# Patient Record
Sex: MALE | URBAN - METROPOLITAN AREA
[De-identification: names, ages, dates, MRNs, and addresses within clinical notes are randomized per-mention and may not be internally consistent; named-entity substitution may affect disease eponyms.]

---

## 2017-01-30 ENCOUNTER — HISTORICAL (OUTPATIENT)
Dept: LAB | Facility: HOSPITAL | Age: 32
End: 2017-01-30

## 2017-10-03 ENCOUNTER — HISTORICAL (OUTPATIENT)
Dept: LAB | Facility: HOSPITAL | Age: 32
End: 2017-10-03

## 2017-10-03 LAB — DEPRECATED CALCIDIOL+CALCIFEROL SERPL-MC: 47.36 NG/ML (ref 30–80)

## 2018-03-05 ENCOUNTER — HISTORICAL (OUTPATIENT)
Dept: LAB | Facility: HOSPITAL | Age: 33
End: 2018-03-05

## 2019-03-21 ENCOUNTER — HISTORICAL (OUTPATIENT)
Dept: LAB | Facility: HOSPITAL | Age: 34
End: 2019-03-21

## 2019-03-21 LAB — DEPRECATED CALCIDIOL+CALCIFEROL SERPL-MC: 15.99 NG/ML (ref 30–80)

## 2019-10-17 ENCOUNTER — HISTORICAL (OUTPATIENT)
Dept: LAB | Facility: HOSPITAL | Age: 34
End: 2019-10-17

## 2019-10-17 LAB
ALBUMIN SERPL-MCNC: 4.5 GM/DL (ref 3.4–5)
ALBUMIN/GLOB SERPL: 1.5 {RATIO}
ALP SERPL-CCNC: 104 UNIT/L (ref 50–136)
ALT SERPL-CCNC: 38 UNIT/L (ref 12–78)
AST SERPL-CCNC: 18 UNIT/L (ref 15–37)
BILIRUB SERPL-MCNC: 0.7 MG/DL (ref 0.2–1)
BILIRUBIN DIRECT+TOT PNL SERPL-MCNC: 0.1 MG/DL (ref 0–0.2)
BILIRUBIN DIRECT+TOT PNL SERPL-MCNC: 0.6 MG/DL (ref 0–0.8)
BUN SERPL-MCNC: 11 MG/DL (ref 7–18)
CALCIUM SERPL-MCNC: 9.8 MG/DL (ref 8.5–10.1)
CHLORIDE SERPL-SCNC: 105 MMOL/L (ref 98–107)
CHOLEST SERPL-MCNC: 219 MG/DL (ref 0–200)
CHOLEST/HDLC SERPL: 7.1 {RATIO} (ref 0–5)
CO2 SERPL-SCNC: 27 MMOL/L (ref 21–32)
CREAT SERPL-MCNC: 0.91 MG/DL (ref 0.7–1.3)
DEPRECATED CALCIDIOL+CALCIFEROL SERPL-MC: 27.94 NG/ML (ref 30–80)
FERRITIN SERPL-MCNC: 107.3 NG/ML (ref 8–388)
GLOBULIN SER-MCNC: 3 GM/DL (ref 2.4–3.5)
GLUCOSE SERPL-MCNC: 109 MG/DL (ref 74–106)
HDLC SERPL-MCNC: 31 MG/DL (ref 35–60)
LDLC SERPL CALC-MCNC: 145 MG/DL (ref 0–129)
POTASSIUM SERPL-SCNC: 4.4 MMOL/L (ref 3.5–5.1)
PROT SERPL-MCNC: 7.5 GM/DL (ref 6.4–8.2)
SODIUM SERPL-SCNC: 138 MMOL/L (ref 136–145)
TRIGL SERPL-MCNC: 213 MG/DL (ref 30–150)
VLDLC SERPL CALC-MCNC: 43 MG/DL

## 2020-02-17 ENCOUNTER — HISTORICAL (OUTPATIENT)
Dept: LAB | Facility: HOSPITAL | Age: 35
End: 2020-02-17

## 2020-02-17 LAB
ABS NEUT (OLG): 4.74 X10(3)/MCL (ref 2.1–9.2)
BASOPHILS # BLD AUTO: 0.1 X10(3)/MCL (ref 0–0.2)
BASOPHILS NFR BLD AUTO: 1 %
DEPRECATED CALCIDIOL+CALCIFEROL SERPL-MC: 29.4 NG/ML (ref 30–80)
EOSINOPHIL # BLD AUTO: 0.2 X10(3)/MCL (ref 0–0.9)
EOSINOPHIL NFR BLD AUTO: 2 %
ERYTHROCYTE [DISTWIDTH] IN BLOOD BY AUTOMATED COUNT: 12.4 % (ref 11.5–17)
HCT VFR BLD AUTO: 48.1 % (ref 42–52)
HGB BLD-MCNC: 15.1 GM/DL (ref 14–18)
LYMPHOCYTES # BLD AUTO: 3.2 X10(3)/MCL (ref 0.6–4.6)
LYMPHOCYTES NFR BLD AUTO: 36 %
MCH RBC QN AUTO: 28.7 PG (ref 27–31)
MCHC RBC AUTO-ENTMCNC: 31.4 GM/DL (ref 33–36)
MCV RBC AUTO: 91.4 FL (ref 80–94)
MONOCYTES # BLD AUTO: 0.7 X10(3)/MCL (ref 0.1–1.3)
MONOCYTES NFR BLD AUTO: 8 %
NEUTROPHILS # BLD AUTO: 4.74 X10(3)/MCL (ref 2.1–9.2)
NEUTROPHILS NFR BLD AUTO: 53 %
PLATELET # BLD AUTO: 398 X10(3)/MCL (ref 130–400)
PMV BLD AUTO: 9.2 FL (ref 9.4–12.4)
RBC # BLD AUTO: 5.26 X10(6)/MCL (ref 4.7–6.1)
TSH SERPL-ACNC: 0.94 MIU/L (ref 0.36–3.74)
WBC # SPEC AUTO: 9 X10(3)/MCL (ref 4.5–11.5)

## 2020-10-14 ENCOUNTER — HISTORICAL (OUTPATIENT)
Dept: ADMINISTRATIVE | Facility: HOSPITAL | Age: 35
End: 2020-10-14

## 2020-10-14 LAB
CRP SERPL HS-MCNC: 0.04 MG/DL
DEPRECATED CALCIDIOL+CALCIFEROL SERPL-MC: 33.4 NG/ML (ref 6.6–49.9)

## 2024-05-01 ENCOUNTER — HOSPITAL ENCOUNTER (OUTPATIENT)
Facility: HOSPITAL | Age: 39
Discharge: HOME | End: 2024-05-02
Attending: STUDENT IN AN ORGANIZED HEALTH CARE EDUCATION/TRAINING PROGRAM | Admitting: INTERNAL MEDICINE
Payer: COMMERCIAL

## 2024-05-01 DIAGNOSIS — R00.0 TACHYCARDIA: ICD-10-CM

## 2024-05-01 DIAGNOSIS — T50.901A OVERDOSE: Primary | ICD-10-CM

## 2024-05-01 DIAGNOSIS — R07.9 CHEST PAIN: ICD-10-CM

## 2024-05-01 LAB
ALBUMIN SERPL-MCNC: 4.2 G/DL (ref 3.5–5)
ALBUMIN/GLOB SERPL: 1.9 RATIO (ref 1.1–2)
ALP SERPL-CCNC: 79 UNIT/L (ref 40–150)
ALT SERPL-CCNC: 20 UNIT/L (ref 0–55)
AMPHET UR QL SCN: NEGATIVE
APPEARANCE UR: CLEAR
AST SERPL-CCNC: 13 UNIT/L (ref 5–34)
BACTERIA #/AREA URNS AUTO: ABNORMAL /HPF
BARBITURATE SCN PRESENT UR: NEGATIVE
BASOPHILS # BLD AUTO: 0.03 X10(3)/MCL
BASOPHILS NFR BLD AUTO: 0.4 %
BENZODIAZ UR QL SCN: POSITIVE
BILIRUB SERPL-MCNC: 0.6 MG/DL
BILIRUB UR QL STRIP.AUTO: NEGATIVE
BUN SERPL-MCNC: 18.2 MG/DL (ref 8.9–20.6)
CALCIUM SERPL-MCNC: 8.7 MG/DL (ref 8.4–10.2)
CANNABINOIDS UR QL SCN: POSITIVE
CHLORIDE SERPL-SCNC: 106 MMOL/L (ref 98–107)
CO2 SERPL-SCNC: 24 MMOL/L (ref 22–29)
COCAINE UR QL SCN: NEGATIVE
COLOR UR AUTO: ABNORMAL
CREAT SERPL-MCNC: 0.92 MG/DL (ref 0.73–1.18)
EOSINOPHIL # BLD AUTO: 0.16 X10(3)/MCL (ref 0–0.9)
EOSINOPHIL NFR BLD AUTO: 2 %
ERYTHROCYTE [DISTWIDTH] IN BLOOD BY AUTOMATED COUNT: 11.5 % (ref 11.5–17)
FENTANYL UR QL SCN: NEGATIVE
FLUAV AG UPPER RESP QL IA.RAPID: NOT DETECTED
FLUBV AG UPPER RESP QL IA.RAPID: NOT DETECTED
GFR SERPLBLD CREATININE-BSD FMLA CKD-EPI: >60 MLS/MIN/1.73/M2
GLOBULIN SER-MCNC: 2.2 GM/DL (ref 2.4–3.5)
GLUCOSE SERPL-MCNC: 139 MG/DL (ref 74–100)
GLUCOSE UR QL STRIP.AUTO: NORMAL
HCT VFR BLD AUTO: 40.4 % (ref 42–52)
HGB BLD-MCNC: 13.3 G/DL (ref 14–18)
IMM GRANULOCYTES # BLD AUTO: 0.02 X10(3)/MCL (ref 0–0.04)
IMM GRANULOCYTES NFR BLD AUTO: 0.2 %
INR PPP: 1
KETONES UR QL STRIP.AUTO: ABNORMAL
LACTATE SERPL-SCNC: 1.9 MMOL/L (ref 0.5–2.2)
LEUKOCYTE ESTERASE UR QL STRIP.AUTO: NEGATIVE
LYMPHOCYTES # BLD AUTO: 2.08 X10(3)/MCL (ref 0.6–4.6)
LYMPHOCYTES NFR BLD AUTO: 25.7 %
MAGNESIUM SERPL-MCNC: 2 MG/DL (ref 1.6–2.6)
MCH RBC QN AUTO: 30.2 PG (ref 27–31)
MCHC RBC AUTO-ENTMCNC: 32.9 G/DL (ref 33–36)
MCV RBC AUTO: 91.8 FL (ref 80–94)
MDMA UR QL SCN: NEGATIVE
MONOCYTES # BLD AUTO: 0.41 X10(3)/MCL (ref 0.1–1.3)
MONOCYTES NFR BLD AUTO: 5.1 %
MUCOUS THREADS URNS QL MICRO: ABNORMAL /LPF
NEUTROPHILS # BLD AUTO: 5.4 X10(3)/MCL (ref 2.1–9.2)
NEUTROPHILS NFR BLD AUTO: 66.6 %
NITRITE UR QL STRIP.AUTO: NEGATIVE
NRBC BLD AUTO-RTO: 0 %
OPIATES UR QL SCN: NEGATIVE
PCP UR QL: NEGATIVE
PH UR STRIP.AUTO: 6 [PH]
PH UR: 6 [PH] (ref 3–11)
PLATELET # BLD AUTO: 274 X10(3)/MCL (ref 130–400)
PMV BLD AUTO: 8.6 FL (ref 7.4–10.4)
POTASSIUM SERPL-SCNC: 3.6 MMOL/L (ref 3.5–5.1)
PROT SERPL-MCNC: 6.4 GM/DL (ref 6.4–8.3)
PROT UR QL STRIP.AUTO: ABNORMAL
PROTHROMBIN TIME: 13.5 SECONDS (ref 12.5–14.5)
RBC # BLD AUTO: 4.4 X10(6)/MCL (ref 4.7–6.1)
RBC #/AREA URNS AUTO: ABNORMAL /HPF
RBC UR QL AUTO: NEGATIVE
RSV A 5' UTR RNA NPH QL NAA+PROBE: NOT DETECTED
SARS-COV-2 RNA RESP QL NAA+PROBE: NOT DETECTED
SODIUM SERPL-SCNC: 143 MMOL/L (ref 136–145)
SP GR UR STRIP.AUTO: 1.01 (ref 1–1.03)
SPECIFIC GRAVITY, URINE AUTO (.000) (OHS): 1.01 (ref 1–1.03)
SQUAMOUS #/AREA URNS LPF: ABNORMAL /HPF
TROPONIN I SERPL-MCNC: <0.01 NG/ML (ref 0–0.04)
UROBILINOGEN UR STRIP-ACNC: NORMAL
WBC # SPEC AUTO: 8.1 X10(3)/MCL (ref 4.5–11.5)
WBC #/AREA URNS AUTO: ABNORMAL /HPF

## 2024-05-01 PROCEDURE — 99285 EMERGENCY DEPT VISIT HI MDM: CPT | Mod: 25

## 2024-05-01 PROCEDURE — 93005 ELECTROCARDIOGRAM TRACING: CPT

## 2024-05-01 PROCEDURE — 85025 COMPLETE CBC W/AUTO DIFF WBC: CPT | Performed by: STUDENT IN AN ORGANIZED HEALTH CARE EDUCATION/TRAINING PROGRAM

## 2024-05-01 PROCEDURE — 80307 DRUG TEST PRSMV CHEM ANLYZR: CPT | Performed by: STUDENT IN AN ORGANIZED HEALTH CARE EDUCATION/TRAINING PROGRAM

## 2024-05-01 PROCEDURE — 93010 ELECTROCARDIOGRAM REPORT: CPT | Mod: 76,,, | Performed by: INTERNAL MEDICINE

## 2024-05-01 PROCEDURE — 80143 DRUG ASSAY ACETAMINOPHEN: CPT | Performed by: NURSE PRACTITIONER

## 2024-05-01 PROCEDURE — 96361 HYDRATE IV INFUSION ADD-ON: CPT

## 2024-05-01 PROCEDURE — 0241U COVID/RSV/FLU A&B PCR: CPT | Performed by: STUDENT IN AN ORGANIZED HEALTH CARE EDUCATION/TRAINING PROGRAM

## 2024-05-01 PROCEDURE — 83605 ASSAY OF LACTIC ACID: CPT | Performed by: STUDENT IN AN ORGANIZED HEALTH CARE EDUCATION/TRAINING PROGRAM

## 2024-05-01 PROCEDURE — 84484 ASSAY OF TROPONIN QUANT: CPT | Performed by: STUDENT IN AN ORGANIZED HEALTH CARE EDUCATION/TRAINING PROGRAM

## 2024-05-01 PROCEDURE — 80053 COMPREHEN METABOLIC PANEL: CPT | Performed by: STUDENT IN AN ORGANIZED HEALTH CARE EDUCATION/TRAINING PROGRAM

## 2024-05-01 PROCEDURE — G0378 HOSPITAL OBSERVATION PER HR: HCPCS

## 2024-05-01 PROCEDURE — 83735 ASSAY OF MAGNESIUM: CPT | Performed by: STUDENT IN AN ORGANIZED HEALTH CARE EDUCATION/TRAINING PROGRAM

## 2024-05-01 PROCEDURE — 80179 DRUG ASSAY SALICYLATE: CPT | Performed by: NURSE PRACTITIONER

## 2024-05-01 PROCEDURE — 81015 MICROSCOPIC EXAM OF URINE: CPT | Performed by: STUDENT IN AN ORGANIZED HEALTH CARE EDUCATION/TRAINING PROGRAM

## 2024-05-01 PROCEDURE — 63600175 PHARM REV CODE 636 W HCPCS: Performed by: STUDENT IN AN ORGANIZED HEALTH CARE EDUCATION/TRAINING PROGRAM

## 2024-05-01 PROCEDURE — 96360 HYDRATION IV INFUSION INIT: CPT

## 2024-05-01 PROCEDURE — 85610 PROTHROMBIN TIME: CPT | Performed by: STUDENT IN AN ORGANIZED HEALTH CARE EDUCATION/TRAINING PROGRAM

## 2024-05-01 RX ORDER — DESVENLAFAXINE 100 MG/1
100 TABLET, EXTENDED RELEASE ORAL DAILY
COMMUNITY

## 2024-05-01 RX ORDER — IBUPROFEN 200 MG
24 TABLET ORAL
Status: DISCONTINUED | OUTPATIENT
Start: 2024-05-02 | End: 2024-05-02 | Stop reason: HOSPADM

## 2024-05-01 RX ORDER — LAMOTRIGINE 150 MG/1
25 TABLET ORAL DAILY
Status: ON HOLD | COMMUNITY
End: 2024-05-02

## 2024-05-01 RX ORDER — NALOXONE HCL 0.4 MG/ML
0.02 VIAL (ML) INJECTION
Status: DISCONTINUED | OUTPATIENT
Start: 2024-05-02 | End: 2024-05-02 | Stop reason: HOSPADM

## 2024-05-01 RX ORDER — TALC
6 POWDER (GRAM) TOPICAL NIGHTLY PRN
Status: DISCONTINUED | OUTPATIENT
Start: 2024-05-02 | End: 2024-05-02 | Stop reason: HOSPADM

## 2024-05-01 RX ORDER — CLONAZEPAM 0.5 MG/1
1 TABLET ORAL 3 TIMES DAILY PRN
Status: ON HOLD | COMMUNITY
End: 2024-05-02

## 2024-05-01 RX ORDER — ALUMINUM HYDROXIDE, MAGNESIUM HYDROXIDE, AND SIMETHICONE 1200; 120; 1200 MG/30ML; MG/30ML; MG/30ML
30 SUSPENSION ORAL 4 TIMES DAILY PRN
Status: DISCONTINUED | OUTPATIENT
Start: 2024-05-02 | End: 2024-05-02 | Stop reason: HOSPADM

## 2024-05-01 RX ORDER — GLUCAGON 1 MG
1 KIT INJECTION
Status: DISCONTINUED | OUTPATIENT
Start: 2024-05-02 | End: 2024-05-02 | Stop reason: HOSPADM

## 2024-05-01 RX ORDER — ACETAMINOPHEN 500 MG
1000 TABLET ORAL EVERY 6 HOURS PRN
Status: DISCONTINUED | OUTPATIENT
Start: 2024-05-02 | End: 2024-05-02 | Stop reason: HOSPADM

## 2024-05-01 RX ORDER — ACETAMINOPHEN 325 MG/1
650 TABLET ORAL EVERY 4 HOURS PRN
Status: DISCONTINUED | OUTPATIENT
Start: 2024-05-02 | End: 2024-05-02 | Stop reason: HOSPADM

## 2024-05-01 RX ORDER — ONDANSETRON HYDROCHLORIDE 2 MG/ML
4 INJECTION, SOLUTION INTRAVENOUS EVERY 4 HOURS PRN
Status: DISCONTINUED | OUTPATIENT
Start: 2024-05-02 | End: 2024-05-02 | Stop reason: HOSPADM

## 2024-05-01 RX ORDER — SODIUM CHLORIDE 0.9 % (FLUSH) 0.9 %
10 SYRINGE (ML) INJECTION
Status: DISCONTINUED | OUTPATIENT
Start: 2024-05-02 | End: 2024-05-02 | Stop reason: HOSPADM

## 2024-05-01 RX ORDER — TRAZODONE HYDROCHLORIDE 100 MG/1
200 TABLET ORAL NIGHTLY
COMMUNITY

## 2024-05-01 RX ORDER — AMOXICILLIN 250 MG
1 CAPSULE ORAL 2 TIMES DAILY PRN
Status: DISCONTINUED | OUTPATIENT
Start: 2024-05-02 | End: 2024-05-02 | Stop reason: HOSPADM

## 2024-05-01 RX ORDER — MIRTAZAPINE 30 MG/1
30 TABLET, ORALLY DISINTEGRATING ORAL NIGHTLY
COMMUNITY

## 2024-05-01 RX ORDER — QUETIAPINE FUMARATE 100 MG/1
100 TABLET, FILM COATED ORAL 3 TIMES DAILY
Status: ON HOLD | COMMUNITY
End: 2024-05-02

## 2024-05-01 RX ORDER — IBUPROFEN 200 MG
16 TABLET ORAL
Status: DISCONTINUED | OUTPATIENT
Start: 2024-05-02 | End: 2024-05-02 | Stop reason: HOSPADM

## 2024-05-01 RX ORDER — PROCHLORPERAZINE EDISYLATE 5 MG/ML
5 INJECTION INTRAMUSCULAR; INTRAVENOUS EVERY 6 HOURS PRN
Status: DISCONTINUED | OUTPATIENT
Start: 2024-05-02 | End: 2024-05-02 | Stop reason: HOSPADM

## 2024-05-01 RX ORDER — BISACODYL 10 MG/1
10 SUPPOSITORY RECTAL DAILY PRN
Status: DISCONTINUED | OUTPATIENT
Start: 2024-05-02 | End: 2024-05-02 | Stop reason: HOSPADM

## 2024-05-01 RX ADMIN — SODIUM CHLORIDE, POTASSIUM CHLORIDE, SODIUM LACTATE AND CALCIUM CHLORIDE 1000 ML: 600; 310; 30; 20 INJECTION, SOLUTION INTRAVENOUS at 04:05

## 2024-05-01 RX ADMIN — SODIUM CHLORIDE, POTASSIUM CHLORIDE, SODIUM LACTATE AND CALCIUM CHLORIDE 1000 ML: 600; 310; 30; 20 INJECTION, SOLUTION INTRAVENOUS at 02:05

## 2024-05-01 NOTE — Clinical Note
Diagnosis: Overdose [202577]   Future Attending Provider: EMMANUEL JIMENEZ [[id number]]   Admit to which facility:: [facility] [[id number]]

## 2024-05-01 NOTE — ED PROVIDER NOTES
Encounter Date: 5/1/2024    SCRIBE #1 NOTE: I, Yao Mike, am scribing for, and in the presence of,  Sumit Bishop MD. I have scribed the following portions of the note - Other sections scribed: HPI, ROS, PE.       History     Chief Complaint   Patient presents with    Drug Overdose     Pt reports he took his Seroquel at a different dose than was ordered for him. Pt appears sedated in triage. Pt awakens to sternal rub. Pt states he is unsure how many he took. Denies SI. 2mg narcan given en route with no change.      Pt is a 38 y.o. male with a pMHx of depression, anxiety, insomnia, and bipolar disorder presents to the ED following a drug overdose last night. Pt states that he was having trouble sleeping so took more Seroquel regulo he normally takes. Per pt's father; pt was a bit altered yesterday, but nothing compared to the way he was found this morning. The pt reportedly dropped his prescription of Clonazepam and lost a significant portion of them. Since then he has been altering his dosage in order to account for the missing meds un til he can refill his prescription. The pt's father believes the overdose was accidental and the pt himself adamantly denies suicidal ideation.     The history is provided by the patient and medical records. No  was used.     Review of patient's allergies indicates:  No Known Allergies  No past medical history on file.  No past surgical history on file.  No family history on file.     Review of Systems   Psychiatric/Behavioral:  Positive for sleep disturbance. Negative for self-injury and suicidal ideas. The patient is nervous/anxious.        Physical Exam     Initial Vitals [05/01/24 1248]   BP Pulse Resp Temp SpO2   130/84 108 15 98.1 °F (36.7 °C) 98 %      MAP       --         Physical Exam    Constitutional: He appears well-developed and well-nourished. He is not diaphoretic. No distress.   Drowsy, but arousable by verbal stimuli.   HENT:   Head:  Normocephalic and atraumatic.   Right Ear: External ear normal.   Left Ear: External ear normal.   Nose: Nose normal.   Pupils are 2-3mm.   Eyes: EOM are normal. Pupils are equal, round, and reactive to light. Right eye exhibits no discharge. Left eye exhibits no discharge.   Cardiovascular:  Normal rate, regular rhythm and normal heart sounds.     Exam reveals no gallop and no friction rub.       No murmur heard.  Pulmonary/Chest: Effort normal and breath sounds normal. No respiratory distress. He has no wheezes. He has no rhonchi. He has no rales. He exhibits no tenderness.   Abdominal: Abdomen is soft. Bowel sounds are normal. He exhibits no distension and no mass. There is no abdominal tenderness. There is no rebound and no guarding.   Musculoskeletal:         General: No edema. Normal range of motion.     Neurological: No cranial nerve deficit or sensory deficit.   Drowsy but arousable   Skin: Skin is warm and dry. Capillary refill takes less than 2 seconds.         ED Course   Critical Care    Date/Time: 5/20/2024 6:02 AM    Performed by: Sumit Bishop MD  Authorized by: Sumit Bishop MD  Direct patient critical care time: 9 minutes  Additional history critical care time: 7 minutes  Ordering / reviewing critical care time: 8 minutes  Documentation critical care time: 5 minutes  Consulting other physicians critical care time: 6 minutes  Total critical care time (exclusive of procedural time) : 35 minutes  Critical care time was exclusive of separately billable procedures and treating other patients.  Critical care was necessary to treat or prevent imminent or life-threatening deterioration of the following conditions: toxidrome.  Critical care was time spent personally by me on the following activities: development of treatment plan with patient or surrogate, discussions with consultants, discussions with primary provider, interpretation of cardiac output measurements, evaluation of patient's response  to treatment, examination of patient, obtaining history from patient or surrogate, ordering and performing treatments and interventions, ordering and review of laboratory studies, ordering and review of radiographic studies, pulse oximetry, re-evaluation of patient's condition and review of old charts.  Comments: Patient with potential overdose requiring admission for monitor returned.        Labs Reviewed   COMPREHENSIVE METABOLIC PANEL - Abnormal; Notable for the following components:       Result Value    Glucose 139 (*)     Globulin 2.2 (*)     All other components within normal limits   URINALYSIS, REFLEX TO URINE CULTURE - Abnormal; Notable for the following components:    Protein, UA Trace (*)     Ketones, UA 1+ (*)     Mucous, UA Trace (*)     All other components within normal limits   DRUG SCREEN, URINE (BEAKER) - Abnormal; Notable for the following components:    Benzodiazepine, Urine Positive (*)     Cannabinoids, Urine Positive (*)     All other components within normal limits    Narrative:     Cut off concentrations:    Amphetamines - 1000 ng/ml  Barbiturates - 200 ng/ml  Benzodiazepine - 200 ng/ml  Cannabinoids (THC) - 50 ng/ml  Cocaine - 300 ng/ml  Fentanyl - 1.0 ng/ml  MDMA - 500 ng/ml  Opiates - 300 ng/ml   Phencyclidine (PCP) - 25 ng/ml    Specimen submitted for drug analysis and tested for pH and specific gravity in order to evaluate sample integrity. Suspect tampering if specific gravity is <1.003 and/or pH is not within the range of 4.5 - 8.0  False negatives may result form substances such as bleach added to urine.  False positives may result for the presence of a substance with similar chemical structure to the drug or its metabolite.    This test provides only a PRELIMINARY analytical test result. A more specific alternate chemical method must be used in order to obtain a confirmed analytical result. Gas chromatography/mass spectrometry (GC/MS) is the preferred confirmatory method. Other  chemical confirmation methods are available. Clinical consideration and professional judgement should be applied to any drug of abuse test result, particularly when preliminary positive results are used.    Positive results will be confirmed only at the physicians request. Unconfirmed screening results are to be used only for medical purposes (treatment).        CBC WITH DIFFERENTIAL - Abnormal; Notable for the following components:    RBC 4.40 (*)     Hgb 13.3 (*)     Hct 40.4 (*)     MCHC 32.9 (*)     All other components within normal limits   ACETAMINOPHEN LEVEL - Abnormal; Notable for the following components:    Acetaminophen Level <3.0 (*)     All other components within normal limits   SALICYLATE LEVEL - Abnormal; Notable for the following components:    Salicylate Level <5.0 (*)     All other components within normal limits   COVID/RSV/FLU A&B PCR - Normal    Narrative:     The Xpert Xpress SARS-CoV-2/FLU/RSV plus is a rapid, multiplexed real-time PCR test intended for the simultaneous qualitative detection and differentiation of SARS-CoV-2, Influenza A, Influenza B, and respiratory syncytial virus (RSV) viral RNA in either nasopharyngeal swab or nasal swab specimens.         TROPONIN I - Normal   MAGNESIUM - Normal   PROTIME-INR - Normal   LACTIC ACID, PLASMA - Normal   CBC W/ AUTO DIFFERENTIAL    Narrative:     The following orders were created for panel order CBC auto differential.  Procedure                               Abnormality         Status                     ---------                               -----------         ------                     CBC with Differential[[id number]]       Abnormal            Final result                 Please view results for these tests on the individual orders.        ECG Results              EKG 12-lead (Final result)        Collection Time Result Time QRS Duration [facility] QTC Calculation    05/01/24 16:08:26 05/02/24 10:56:24 88 460                     Final result  by Interface, Lab In [facility] (05/02/24 10:56:32)                   Narrative:    Test Reason : T50.901A,    Vent. Rate : 120 BPM     Atrial Rate : 120 BPM     P-R Int : 164 ms          QRS Dur : 088 ms      QT Int : 326 ms       P-R-T Axes : 054 094 -08 degrees     QTc Int : 460 ms    Sinus tachycardia  Rightward axis  T wave abnormality, consider inferior ischemia  Abnormal ECG  No previous ECGs available  Confirmed by Mehdi Negrete MD ([id number]) on 5/2/2024 10:56:23 AM    Referred By:             Confirmed By:Mehdi Negrete MD                                     EKG 12-lead (Final result)        Collection Time Result Time QRS Duration [facility] QTC Calculation    05/01/24 13:04:04 05/02/24 10:54:59 92 483                     Final result by Interface, Lab In [facility] (05/02/24 10:55:08)                   Narrative:    Test Reason : R00.0,    Vent. Rate : 106 BPM     Atrial Rate : 106 BPM     P-R Int : 174 ms          QRS Dur : 092 ms      QT Int : 364 ms       P-R-T Axes : 017 019 010 degrees     QTc Int : 483 ms    Sinus tachycardia  Nonspecific T wave abnormality  Abnormal ECG  No previous ECGs available  Confirmed by Mehdi Negrete MD ([id number]) on 5/2/2024 10:54:56 AM    Referred By:             Confirmed By:Mehdi Negrete MD                                     EKG 12-lead (Final result)  Result time 05/08/24 14:03:18      Final result by Unknown User (05/08/24 14:03:18)                                      Imaging Results              CT Head Without Contrast (Final result)  Result time 05/01/24 13:52:59      Final result by Santhosh Arevalo MD (05/01/24 13:52:59)                   Impression:      No acute intracranial findings identified.      Electronically signed by: Santhosh Arevalo  Date:    05/01/2024  Time:    13:52               Narrative:    EXAMINATION:  CT HEAD WITHOUT CONTRAST    CLINICAL HISTORY:  Mental status change, unknown cause;    TECHNIQUE:  Sequential axial images were performed of the brain without contrast.    Dose  product length of 2192 mGycm. Automated exposure control was utilized to minimize radiation dose.    COMPARISON:  None available.    FINDINGS:  Gray-white matter differentiation is unremarkable.  There is no intracranial mass effect, midline shift, hydrocephalus or hemorrhage. There is no sulcal effacement or low attenuation changes to suggest recent large vessel territory infarction.  There is no acute extra axial fluid collection. Visualized paranasal sinuses are clear without mucosal thickening, polypoidal abnormality or air-fluid levels. Mastoid air cells aeration is optimal.                                       X-Ray Chest AP Portable (Final result)  Result time 05/01/24 13:37:08      Final result by Ernie Ferguson MD (05/01/24 13:37:08)                   Impression:      No acute findings.      Electronically signed by: Ernie Ferguson  Date:    05/01/2024  Time:    13:37               Narrative:    EXAMINATION:  XR CHEST AP PORTABLE    CLINICAL HISTORY:  Overdose;    COMPARISON:  No priors    FINDINGS:  Frontal view of the chest was obtained. The heart is not enlarged.  Lungs are grossly clear.  There is no pneumothorax or significant effusion.                                       Medications   lactated ringers bolus 1,000 mL (0 mLs Intravenous Stopped 5/1/24 1508)   lactated ringers bolus 1,000 mL (0 mLs Intravenous Stopped 5/1/24 1730)     Medical Decision Making  The list of differential diagnoses includes, but is not limited to, suicidal ideations, homicidal ideations, auditory or visual hallucinations, drug/etoh intoxication, bipolar disorder, schizophrenia, schizoaffective, delusional disorder, major depressive disorder, PTSD, substance induced mood disorder, violent behavior, suicidal attempt, non-psychiatric medical illness, malingering      Patient appears to have had a accidental overdose.  He was adamant he had not attempt to harm himself however he remains somewhat drowsy I am not comfortable  sending him home.  He was tachycardia is resolved.  His QT-C remains within normal limits.  He was arousable more alert on re-evaluation but still uncomfortable sending him home.  Chart review does not reveal any past medical history of suicide attempts but does reveal psych history.  We will admit to observation hopefully he can further metabolize his medications until he was more awake and alert than he will be suitable for discharge home.  Patient was comfortable with the plan.  Care transferred.    Amount and/or Complexity of Data Reviewed  External Data Reviewed: notes.     Details: History of depression per chart review  Labs: ordered. Decision-making details documented in ED Course.  Radiology: ordered.  ECG/medicine tests: ordered and independent interpretation performed. Decision-making details documented in ED Course.    Risk  OTC drugs.  Prescription drug management.  Decision regarding hospitalization.            Scribe Attestation:   Scribe #1: I performed the above scribed service and the documentation accurately describes the services I performed. I attest to the accuracy of the note.    Attending Attestation:           Physician Attestation for Scribe:  Physician Attestation Statement for Scribe #1: I, Sumit Bishop MD, reviewed documentation, as scribed by Yao Mike in my presence, and it is both accurate and complete.             ED Course as of 05/20/24 0603   Wed May 01, 2024   1331 EKG done at 1:04 p.m. shows sinus tach rate of 106 .  Normal axis no ST elevation or depression.   [MM]   1522 Influenza A, Molecular: Not Detected [MM]   1522 Influenza B, Molecular: Not Detected [MM]   1522 RSV Ag by Molecular Method: Not Detected [MM]   1522 SARS-CoV2 (COVID-19) Qualitative PCR: Not Detected [MM]   1522 Troponin I: <0.010 [MM]   1522 Magnesium : 2.00 [MM]   1522 INR: 1.0 [MM]   1522 WBC: 8.10 [MM]   1522 Hemoglobin(!): 13.3 [MM]   1522 Hematocrit(!): 40.4 [MM]   1522 Sodium: 143 [MM]    1522 Potassium: 3.6 [MM]   1522 Chloride: 106 [MM]   1522 CO2: 24 [MM]   1522 BUN: 18.2 [MM]   1522 Creatinine: 0.92 [MM]   1618 Patient beginning to become somewhat tachycardic.  Repeat EKG is ordered.  Done at 4:08 p.m. shows sinus tach rate of 120.  .  Normal axis.  Continued inverted T-wave in the 3.  .  [MM]   1629 I have seen evaluated patient.  More awake and alert.  Trying to get out of bed so he can go urinate.  Will be assisted to standing by nurse.  Heart rate 120s whenever he was in the room.  We will continue to monitor. [MM]   1731 Benzodiazepine, Urine(!): Positive [MM]   1731 Cannabinoids, Urine(!): Positive [MM]   2000 On re-evaluation patient was slightly more awake.  He was tachycardic has resolved after fluid unfortunately he continues to be significantly altered not making much sense.  He continues to adamantly deny any suicide attempts earlier today.  I am still unable to get a good story about what occurred today.  I believe we will have to admit him to observation due to his continued drowsiness. [MM]      ED Course User Index  [MM] Sumit Bishop MD                           Clinical Impression:  Final diagnoses:  [T50.901A] Overdose (Primary)  [R00.0] Tachycardia          ED Disposition Condition    Observation Stable                Sumit Bishop MD  05/01/24 2030       Sumit Bishop MD  05/20/24 0603

## 2024-05-02 ENCOUNTER — HOSPITAL ENCOUNTER (EMERGENCY)
Facility: HOSPITAL | Age: 39
Discharge: HOME | End: 2024-05-02
Attending: STUDENT IN AN ORGANIZED HEALTH CARE EDUCATION/TRAINING PROGRAM
Payer: COMMERCIAL

## 2024-05-02 VITALS
BODY MASS INDEX: 29.62 KG/M2 | HEART RATE: 107 BPM | SYSTOLIC BLOOD PRESSURE: 129 MMHG | OXYGEN SATURATION: 98 % | HEIGHT: 69 IN | DIASTOLIC BLOOD PRESSURE: 81 MMHG | TEMPERATURE: 99 F | RESPIRATION RATE: 20 BRPM | WEIGHT: 200 LBS

## 2024-05-02 VITALS
BODY MASS INDEX: 29.62 KG/M2 | TEMPERATURE: 98 F | HEART RATE: 100 BPM | OXYGEN SATURATION: 96 % | RESPIRATION RATE: 18 BRPM | DIASTOLIC BLOOD PRESSURE: 89 MMHG | WEIGHT: 200 LBS | HEIGHT: 69 IN | SYSTOLIC BLOOD PRESSURE: 128 MMHG

## 2024-05-02 DIAGNOSIS — Z76.0 MEDICATION REFILL: Primary | ICD-10-CM

## 2024-05-02 PROBLEM — T50.901A OVERDOSE: Status: ACTIVE | Noted: 2024-05-02

## 2024-05-02 LAB
APAP SERPL-MCNC: <3 UG/ML (ref 10–30)
OHS QRS DURATION: 88 MS
OHS QRS DURATION: 92 MS
OHS QTC CALCULATION: 460 MS
OHS QTC CALCULATION: 483 MS
SALICYLATES SERPL-MCNC: <5 MG/DL (ref 15–30)

## 2024-05-02 PROCEDURE — G0378 HOSPITAL OBSERVATION PER HR: HCPCS

## 2024-05-02 PROCEDURE — 99281 EMR DPT VST MAYX REQ PHY/QHP: CPT

## 2024-05-02 PROCEDURE — 25000003 PHARM REV CODE 250: Performed by: INTERNAL MEDICINE

## 2024-05-02 RX ORDER — DESVENLAFAXINE SUCCINATE 50 MG/1
100 TABLET, EXTENDED RELEASE ORAL DAILY
Status: DISCONTINUED | OUTPATIENT
Start: 2024-05-02 | End: 2024-05-02 | Stop reason: HOSPADM

## 2024-05-02 RX ORDER — QUETIAPINE FUMARATE 50 MG/1
50 TABLET, FILM COATED ORAL 3 TIMES DAILY
Start: 2024-05-02

## 2024-05-02 RX ORDER — CLONAZEPAM 0.5 MG/1
0.5 TABLET ORAL 3 TIMES DAILY PRN
Start: 2024-05-02 | End: 2024-05-02

## 2024-05-02 RX ORDER — QUETIAPINE FUMARATE 25 MG/1
50 TABLET, FILM COATED ORAL 2 TIMES DAILY
Status: DISCONTINUED | OUTPATIENT
Start: 2024-05-02 | End: 2024-05-02 | Stop reason: HOSPADM

## 2024-05-02 RX ORDER — CLONAZEPAM 0.5 MG/1
0.5 TABLET ORAL 2 TIMES DAILY
Status: DISCONTINUED | OUTPATIENT
Start: 2024-05-02 | End: 2024-05-02 | Stop reason: HOSPADM

## 2024-05-02 RX ORDER — LAMOTRIGINE 150 MG/1
150 TABLET ORAL DAILY
Start: 2024-05-02

## 2024-05-02 RX ORDER — CLONAZEPAM 0.25 MG/1
0.25 TABLET, ORALLY DISINTEGRATING ORAL 2 TIMES DAILY PRN
Qty: 28 TABLET | Refills: 0 | Status: SHIPPED | OUTPATIENT
Start: 2024-05-02 | End: 2024-05-16

## 2024-05-02 RX ADMIN — DESVENLAFAXINE 100 MG: 50 TABLET, FILM COATED, EXTENDED RELEASE ORAL at 08:05

## 2024-05-02 RX ADMIN — QUETIAPINE FUMARATE 50 MG: 25 TABLET ORAL at 10:05

## 2024-05-02 RX ADMIN — CLONAZEPAM 0.5 MG: 0.5 TABLET ORAL at 10:05

## 2024-05-02 NOTE — H&P
[facility] Hospital Medicine - H&P Note    Patient Name: Ernesto Cuello IV  : 1985  MRN: [id number]  PCP: Wang Marinelli MD  Admitting Physician:  GARETH Atkinson MD  Admission Class: OP- Observation   Code status: Full    Allergies   Patient has no known allergies.    Chief Complaint   Unable to obtain    History of Present Illness   38 yr old male whose history includes depression, anxiety, and bipolar disorder. At the time of my exam patient appears drowsy but he is awake and answering questions. However, he is either unable or unwilling to state why he came to the ED. He speech is rambling. Initially he stated he ran out of his clonazepam and came to ED so he would not go into withdrawals. Then he reported he took Seroquel 300 mg at once instead of the TID divided doses because it works better. Adamantly denies any suicidal ideation. According to ED note his father brought him in for evaluation because he appeared altered.     VS on arrival: T 98.1, P 108, R 15, B/P 130/84. Initial labs unremarkable. UDS positive for benzodiazepines and cannabinoids. Chest x-ray and head CT both negative for acute findings.     ROS   Except as documented, all other systems reviewed and negative     Past Medical History   Anxiety   Depression  Bipolar disorder    Past Surgical History   [place] tooth extraction     Social History   Current everyday smoker. Denies alcohol use. Smokes marijuana.     Family History   Reviewed and negative    Home Medications     No current facility-administered medications on file prior to encounter.     Current Outpatient Medications on File Prior to Encounter   Medication Sig Dispense Refill    clonazePAM (KLONOPIN) 0.5 MG tablet Take 1 mg by mouth 3 (three) times daily as needed for Anxiety.      desvenlafaxine succinate (PRISTIQ) 100 MG Tb24 Take 100 mg by mouth once daily.      lamoTRIgine (LAMICTAL) 150 MG Tab Take 25 mg by mouth once daily.       mirtazapine (REMERON SOL-TAB) 30 MG disintegrating tablet Take 30 mg by mouth every evening.      QUEtiapine (SEROQUEL) 100 MG Tab Take 100 mg by mouth 3 (three) times daily.      traZODone (DESYREL) 100 MG tablet Take 200 mg by mouth every evening.        Physical Exam   Vital Signs  Temp:  [98.1 °F (36.7 °C)]   Pulse:  []   Resp:  [13-19]   BP: (119-151)/(78-94)   SpO2:  [95 %-99 %]    General: Appears comfortable  HEENT: NC/AT  Neck:  No JVD  Chest: CTABL  CVS: Regular rhythm. Normal S1/S2.  Abdomen: nondistended, normoactive BS, soft and non-tender.  MSK: No obvious deformity or joint swelling  Skin: Warm and dry  Neuro: AAOx3, no focal neurological deficit  Psych: Cooperative    Labs     Recent Labs     05/01/24  1315   WBC 8.10   RBC 4.40*   HGB 13.3*   HCT 40.4*   MCV 91.8   MCH 30.2   MCHC 32.9*   RDW 11.5        Recent Labs     05/01/24  1315   PROTIME 13.5   INR 1.0      Recent Labs     05/01/24  1315      K 3.6   CHLORIDE 106   CO2 24   BUN 18.2   CREATININE 0.92   EGFRNORACEVR >60   GLUCOSE 139*   CALCIUM 8.7   MG 2.00   ALBUMIN 4.2   GLOBULIN 2.2*   ALKPHOS 79   ALT 20   AST 13   BILITOT 0.6     Recent Labs     05/01/24  1315   LACTIC 1.9     Recent Labs     05/01/24  1315   TROPONINI <0.010        Microbiology Results (last 7 days)       ** No results found for the last 168 hours. **           Imaging   CT Head Without Contrast  Narrative: EXAMINATION:  CT HEAD WITHOUT CONTRAST    CLINICAL HISTORY:  Mental status change, unknown cause;    TECHNIQUE:  Sequential axial images were performed of the brain without contrast.    Dose product length of 2192 mGycm. Automated exposure control was utilized to minimize radiation dose.    COMPARISON:  None available.    FINDINGS:  Gray-white matter differentiation is unremarkable.  There is no intracranial mass effect, midline shift, hydrocephalus or hemorrhage. There is no sulcal effacement or low attenuation changes to suggest recent large  vessel territory infarction.  There is no acute extra axial fluid collection. Visualized paranasal sinuses are clear without mucosal thickening, polypoidal abnormality or air-fluid levels. Mastoid air cells aeration is optimal.  Impression: No acute intracranial findings identified.    Electronically signed by: Santhosh Arevalo  Date:    05/01/2024  Time:    13:52  X-Ray Chest AP Portable  Narrative: EXAMINATION:  XR CHEST AP PORTABLE    CLINICAL HISTORY:  Overdose;    COMPARISON:  No priors    FINDINGS:  Frontal view of the chest was obtained. The heart is not enlarged.  Lungs are grossly clear.  There is no pneumothorax or significant effusion.  Impression: No acute findings.    Electronically signed by: Ernie Ferguson  Date:    05/01/2024  Time:    13:37    Assessment & Plan     Clonazepam overdose - intentional misuse but no suicidal ideation   Acute toxic encephalopathy 2/2 above    - treatment is supportive. Aside from lethargy and mild tachycardia he is hemodynamically stable. No QT prolongation on ECG. Continue telemetry monitoring.   - will check acetaminophen and salicylate levels to complete workup  - will resume clonazepam once sedation wears off to avoid withdrawals    PMH: anxiety, depression, Bipolar disorder    VTE Prophylaxis: Sidney BERGERON, Sara Osborne, [facility] have discussed this patients case with Dr. Atkinson who agrees with the diagnosis and treatment plan.      ________________________________________________________________________  I, Dr. Satinder Atkinson assumed care of this patient.  For the patient encounter, I performed the substantive portion of the visit, I reviewed the NPPA documentation, treatment plan, and medical decision making.  I had face to face time with this patient.  I have personally reviewed the labs and test results that are presently available. I have reviewed or attempted to review medical records based upon their availability. If patient was admitted under observational  status it is with my approval.      38-year-old male with reported completely unintentional overdose of clonazepam, family members found him altered.  He reports he took no less than 5 pills of clonazepam which are about 1 mg each but is not sure why he did this and can not give a great explanation.  He absolutely denies suicidal ideation.  Denies any prior history of being suicidal or suicide attempts.  His mentation is still not quite normal and he was very drowsy so will monitor overnight.    Time seen:  11:00 p.m. 5/1  Critical care time: 35 min; Critical care diagnosis:  Toxic encephalopathy/overdose   Satinder Atkinson MD

## 2024-05-02 NOTE — PLAN OF CARE
05/02/24 1209   Discharge Assessment   Assessment Type Discharge Planning Assessment   Confirmed/corrected address, phone number and insurance Yes   Confirmed Demographics   (pt's address: [address] [place] [address], [place])   Source of Information patient   Communicated MILES with patient/caregiver Date not available/Unable to determine   Reason For Admission over dose, chest pain   People in Home significant other  (Pt lives with his Karen alfredo in a single story home with no steps to enter)   Do you expect to return to your current living situation? Yes   Do you have help at home or someone to help you manage your care at home? No   Prior to hospitilization cognitive status: Unable to Assess   Current cognitive status: Alert/Oriented   Walking or Climbing Stairs Difficulty no   Dressing/Bathing Difficulty no   Home Layout Able to live on 1st floor   Equipment Currently Used at Home none   Readmission within 30 days? No   Patient currently being followed by outpatient case management? No   Do you currently have service(s) that help you manage your care at home? No   Do you have prescription coverage? Yes   Coverage BCBS   Who is going to help you get home at discharge? parents or karen alfredo   How do you get to doctors appointments? family or friend will provide   Are you on dialysis? No   Discharge Plan A Home   Discharge Plan B Home   DME Needed Upon Discharge  none   Discharge Plan discussed with: Patient   Transition of Care Barriers None   OTHER   Name(s) of People in Home Karen alfredo     Pt's PCP is Dr Wang Marinelli. Pt's  is his father, Ernesto ([phone]). Pt has never had HH services. Pt uses [facility]. Pt does not work and is in b/w jobs at this time. Pt has no dc needs at this time.

## 2024-05-02 NOTE — CONSULTS
5/2/2024  Ernesto Cuello IV   1985   [id number]            Psychiatry Initial Consult Note    Date of Admission: 5/1/2024 12:52 PM    Chief Complaint: Accidental overdose     SUBJECTIVE:   History of Present Illness:   Ernesto Cuello IV is a 38 y.o. male with a history of bipolar disorder and depression who presented to [facility] ED on 05/01/24 due to accidental drug overdose. He reports today that he dropped his clonazepam prescription and lost a majority of them in early April and had to ration his medications and took 0.25mg at a time. Reports he received his new prescription yesterday and took one mg resulting in altered level of consciousness. He reports that this was not a suicide attempt and last suicidal ideations were in 2020 when he was admitted to the inpatient psychiatric hospital. Reports history of manic and depressive episodes. Last manic episode in December 2022. Recent intermittent feelings of depression due to looking for a job, but no issues with sleep/appetite, loss of interest in normal activities, or feelings of guilt. No evidence of psychosis or simon at this time. Reports that he started seeing his current outpatient psychiatric provider approximately six months ago who has gotten him off several medications and that his next plan is to wean himself off of clonazepam with her help. Displays future-directed thinking and is a low immediate risk for self-harm.        Past Psychiatric History:   Previous Psychiatric Hospitalizations: Reports hospitalization in 2020 for depression/suicidal ideations   Previous Medication Trials: Rexulti, seroquel, trazodone, pristiq, clonazepam  Previous Suicide Attempts: Denies   Outpatient psychiatrist: Karen REYNA    Past Medical/Surgical History:   No past medical history on file.  No past surgical history on file.      Family Psychiatric History:   Denies     Allergies:   Review of patient's allergies indicates:  No Known Allergies    Substance  Abuse History:   Tobacco: Denies  Alcohol: Denies  Illicit Substances: Denies, UDS positive for cannabinoids  Treatment: Denies      Current Medications:   Home Psychiatric Meds: Clonazepam 1mg PO TID, desvenlafaxine 100mg PO QD; Lamictal 150mg PO BID; Remeron 30mg PO QHS; Quetiapine 100mg PO QAM/100mg PO Qnoon/400mg PO QHS; Trazodone 200mg PO QHS    Scheduled Meds:   Current Facility-Administered Medications   Medication Dose Route Frequency    clonazePAM  0.5 mg Oral BID    desvenlafaxine succinate  100 mg Oral Daily    QUEtiapine  50 mg Oral BID      PRN Meds:   Current Facility-Administered Medications:     acetaminophen, 1,000 mg, Oral, Q6H PRN    acetaminophen, 650 mg, Oral, Q4H PRN    aluminum-magnesium hydroxide-simethicone, 30 mL, Oral, QID PRN    bisacodyL, 10 mg, Rectal, Daily PRN    dextrose 10%, 12.5 g, Intravenous, PRN    dextrose 10%, 25 g, Intravenous, PRN    glucagon (human recombinant), 1 mg, Intramuscular, PRN    glucose, 16 g, Oral, PRN    glucose, 24 g, Oral, PRN    melatonin, 6 mg, Oral, Nightly PRN    naloxone, 0.02 mg, Intravenous, PRN    ondansetron, 4 mg, Intravenous, Q4H PRN    prochlorperazine, 5 mg, Intravenous, Q6H PRN    senna-docusate 8.6-50 mg, 1 tablet, Oral, BID PRN    sodium chloride 0.9%, 10 mL, Intravenous, PRN   Psychotherapeutics (From admission, onward)      Start     Stop Route Frequency Ordered    05/02/24 1000  QUEtiapine tablet 50 mg         -- Oral 2 times daily 05/02/24 0856    05/02/24 0900  desvenlafaxine succinate 24 hr tablet 100 mg         -- Oral Daily 05/02/24 0621              Social History:  Housing Status: Lives with significant other  Relationship Status/Sexual Orientation: Single   Children: 0  Education: Some college   Employment Status/Info: Not currently working    history: Denies  History of physical/sexual abuse: Denies   Access to gun: Denies       Legal History:   Past Charges/Incarcerations: Denies   Pending charges: Denies      OBJECTIVE:        Vitals   Vitals:    05/02/24 1157   BP: 114/74   Pulse: 94   Resp: 18   Temp: 97.5 °F (36.4 °C)        Labs/Imaging/Studies:   Recent Results (from the past 48 hour(s))   EKG 12-lead    Collection Time: 05/01/24  1:04 PM   Result Value Ref Range    QRS Duration 92 ms    [facility] QTC Calculation 483 ms   Comprehensive metabolic panel    Collection Time: 05/01/24  1:15 PM   Result Value Ref Range    Sodium Level 143 136 - 145 mmol/L    Potassium Level 3.6 3.5 - 5.1 mmol/L    Chloride 106 98 - 107 mmol/L    Carbon Dioxide 24 22 - 29 mmol/L    Glucose Level 139 (H) 74 - 100 mg/dL    Blood Urea Nitrogen 18.2 8.9 - 20.6 mg/dL    Creatinine 0.92 0.73 - 1.18 mg/dL    Calcium Level Total 8.7 8.4 - 10.2 mg/dL    Protein Total 6.4 6.4 - 8.3 gm/dL    Albumin Level 4.2 3.5 - 5.0 g/dL    Globulin 2.2 (L) 2.4 - 3.5 gm/dL    Albumin/Globulin Ratio 1.9 1.1 - 2.0 ratio    Bilirubin Total 0.6 <=1.5 mg/dL    Alkaline Phosphatase 79 40 - 150 unit/L    Alanine Aminotransferase 20 0 - 55 unit/L    Aspartate Aminotransferase 13 5 - 34 unit/L    eGFR >60 mls/min/1.73/m2   Troponin I    Collection Time: 05/01/24  1:15 PM   Result Value Ref Range    Troponin-I <0.010 0.000 - 0.045 ng/mL   Magnesium    Collection Time: 05/01/24  1:15 PM   Result Value Ref Range    Magnesium Level 2.00 1.60 - 2.60 mg/dL   Protime-INR    Collection Time: 05/01/24  1:15 PM   Result Value Ref Range    PT 13.5 12.5 - 14.5 seconds    INR 1.0 <=1.3   Lactic acid, plasma    Collection Time: 05/01/24  1:15 PM   Result Value Ref Range    Lactic Acid Level 1.9 0.5 - 2.2 mmol/L   CBC with Differential    Collection Time: 05/01/24  1:15 PM   Result Value Ref Range    WBC 8.10 4.50 - 11.50 x10(3)/mcL    RBC 4.40 (L) 4.70 - 6.10 x10(6)/mcL    Hgb 13.3 (L) 14.0 - 18.0 g/dL    Hct 40.4 (L) 42.0 - 52.0 %    MCV 91.8 80.0 - 94.0 fL    MCH 30.2 27.0 - 31.0 pg    MCHC 32.9 (L) 33.0 - 36.0 g/dL    RDW 11.5 11.5 - 17.0 %    Platelet 274 130 - 400 x10(3)/mcL    MPV 8.6 7.4 - 10.4 fL     Neut % 66.6 %    Lymph % 25.7 %    Mono % 5.1 %    Eos % 2.0 %    Basophil % 0.4 %    Lymph # 2.08 0.6 - 4.6 x10(3)/mcL    Neut # 5.40 2.1 - 9.2 x10(3)/mcL    Mono # 0.41 0.1 - 1.3 x10(3)/mcL    Eos # 0.16 0 - 0.9 x10(3)/mcL    Baso # 0.03 <=0.2 x10(3)/mcL    IG# 0.02 0 - 0.04 x10(3)/mcL    IG% 0.2 %    NRBC% 0.0 %   Acetaminophen Level    Collection Time: 05/01/24  1:15 PM   Result Value Ref Range    Acetaminophen Level <3.0 (L) 10.0 - 30.0 ug/ml   Salicylate Level    Collection Time: 05/01/24  1:15 PM   Result Value Ref Range    Salicylate Level <5.0 (L) 15.0 - 30.0 mg/dL   COVID/RSV/FLU A&B PCR    Collection Time: 05/01/24  2:06 PM   Result Value Ref Range    Influenza A PCR Not Detected Not Detected    Influenza B PCR Not Detected Not Detected    Respiratory Syncytial Virus PCR Not Detected Not Detected    SARS-CoV-2 PCR Not Detected Not Detected, Negative   EKG 12-lead    Collection Time: 05/01/24  4:08 PM   Result Value Ref Range    QRS Duration 88 ms    [facility] QTC Calculation 460 ms   Urinalysis, Reflex to Urine Culture    Collection Time: 05/01/24  4:30 PM    Specimen: Urine   Result Value Ref Range    Color, UA Light-Yellow Yellow, Light-Yellow, Colorless, Straw, Dark-Yellow    Appearance, UA Clear Clear    Specific Gravity, UA 1.015 1.005 - 1.030    pH, UA 6.0 5.0 - 8.5    Protein, UA Trace (A) Negative    Glucose, UA Normal Negative, Normal    Ketones, UA 1+ (A) Negative    Blood, UA Negative Negative    Bilirubin, UA Negative Negative    Urobilinogen, UA Normal 0.2, 1.0, Normal    Nitrites, UA Negative Negative    Leukocyte Esterase, UA Negative Negative    WBC, UA 0-5 None Seen, 0-2, 3-5, 0-5 /HPF    Bacteria, UA None Seen None Seen, Trace /HPF    Squamous Epithelial Cells, UA None Seen None Seen /HPF    Mucous, UA Trace (A) None Seen /LPF    RBC, UA None Seen None Seen, 0-2, 3-5, 0-5 /HPF   Drug Screen, Urine    Collection Time: 05/01/24  4:30 PM   Result Value Ref Range    Amphetamines, Urine Negative  "Negative    Barbituates, Urine Negative Negative    Benzodiazepine, Urine Positive (A) Negative    Cannabinoids, Urine Positive (A) Negative    Cocaine, Urine Negative Negative    Fentanyl, Urine Negative Negative    MDMA, Urine Negative Negative    Opiates, Urine Negative Negative    Phencyclidine, Urine Negative Negative    pH, Urine 6.0 3.0 - 11.0    Specific Gravity, Urine Auto 1.015 1.001 - 1.035      No results found for: "PHENYTOIN", "PHENOBARB", "VALPROATE", "CBMZ"        Psychiatric Mental Status Exam:  General Appearance: appears stated age, dressed in hospital garb, lying in bed, in no acute distress  Arousal: alert  Behavior: normal; cooperative; reasonably friendly, pleasant, and polite; appropriate eye-contact; under good behavioral control  Movements and Motor Activity: no tics, no tremors, no akathisia, no dystonia, no evidence of tardive dyskinesia  Orientation: intact; oriented fully to person, place, time and situation  Speech: intact; normal rate, rhythm, volume, tone and pitch; conversational, spontaneous, and coherent  Mood: Euthymic  Affect: normal, euthymic, reactive, full-range, mood-congruent, appropriate to situation and context  Thought Process: linear, goal-directed  Associations: intact, no loosening of associations  Thought Content and Perceptions: no suicidal or homicidal ideation, no auditory or visual hallucinations, no paranoid ideation, no ideas of reference, no evidence of delusions or psychosis  Recent and Remote Memory: grossly intact; per interview/observation with patient  Attention and Concentration: grossly intact; per interview/observation with patient  Fund of Knowledge: grossly intact; based on history, vocabulary, fund of knowledge, syntax, grammar, and content  Insight: adequate; based on understanding of severity of illness and HPI  Judgment: adequate; based on patient's behavior and HPI          ASSESSMENT/PLAN:   Diagnoses:  MOOD DISORDERS; Bipolar Disorder NOS " (F31.9)               Problem lists and Management Plans:  No medication recommendations at this time. Has been working with current outpatient provider to adjust medications and reports plans to work with her to taper off clonazepam  Recommend follow-up with current outpatient provider after discharge.  PEC not needed at this time. Low risk for immediate self-harm.  Will sign-off; please reconsult as needed.      Shubham Ricci

## 2024-05-02 NOTE — DISCHARGE SUMMARY
[facility] - 6th Floor [facility] MEDICINE - DISCHARGE SUMMARY    Patient Name: Ernesto Cuello IV  MRN: [id number]  Admission Date: 5/1/2024  Discharge Date: 05/02/2024  Hospital Length of Stay: 0 days  Discharge Provider: Mustapha Olea MD  Primary Care Provider: Wang Marinelli MD      HOSPITAL COURSE   38 yr old male whose history includes depression, anxiety, and bipolar disorder. At the time of my exam patient appears drowsy but he is awake and answering questions. However, he is either unable or unwilling to state why he came to the ED. He speech is rambling. Initially he stated he ran out of his clonazepam and came to ED so he would not go into withdrawals. Then he reported he took Seroquel 300 mg at once instead of the TID divided doses because it works better. Adamantly denies any suicidal ideation. According to ED note his father brought him in for evaluation because he appeared altered.      VS on arrival: T 98.1, P 108, R 15, B/P 130/84. Initial labs unremarkable. UDS positive for benzodiazepines and cannabinoids. Chest x-ray and head CT both negative for acute findings.       Essentially admitted for increased drowsyness.  He apparently was out of seroquel and klonipin for some time and when finally received, he took regular dose of seroquel and 1mg klonipin.  This morning he is diong much better after washing out. We will restart at half the dose he took so 0.5mg klonipin and 50mg seroquel, seen by psychiatry and no recs were provided, recommended he follow up with his long time provider to help adjust.            PHYSICAL EXAM     Most Recent Vital Signs:  Temp: 97.9 °F (36.6 °C) (05/02/24 1525)  Pulse: 100 (05/02/24 1525)  Resp: 18 (05/02/24 1157)  BP: 128/89 (05/02/24 1525)  SpO2: 96 % (05/02/24 1525)   GENERAL: In no acute distress, afebrile  HEENT:  CHEST: Clear to auscultation bilaterally  HEART: S1, S2, no appreciable murmur  ABDOMEN: Soft, nontender, BS  +  MSK: Warm, no lower extremity edema, no clubbing or cyanosis  NEUROLOGIC: Alert and oriented x4, moving all extremities with good strength   INTEGUMENTARY:  PSYCHIATRY:          DISCHARGE DIAGNOSIS   Unintentional overdose on Klonipin and Seroquel  Acute toxic encephalopathy-r 2/2 above      _____________________________________________________________________________      DISCHARGE MED REC     Current Discharge Medication List        CONTINUE these medications which have CHANGED    Details   clonazePAM (KLONOPIN) 0.5 MG tablet Take 1 tablet (0.5 mg total) by mouth 3 (three) times daily as needed for Anxiety.      lamoTRIgine (LAMICTAL) 150 MG Tab Take 1 tablet (150 mg total) by mouth once daily.      QUEtiapine (SEROQUEL) 50 MG tablet Take 1 tablet (50 mg total) by mouth 3 (three) times daily.           CONTINUE these medications which have NOT CHANGED    Details   desvenlafaxine succinate (PRISTIQ) 100 MG Tb24 Take 100 mg by mouth once daily.      mirtazapine (REMERON SOL-TAB) 30 MG disintegrating tablet Take 30 mg by mouth every evening.      traZODone (DESYREL) 100 MG tablet Take 200 mg by mouth every evening.                CONSULTS     Consults (From admission, onward)          Status Ordering Provider     Inpatient consult to Psychiatry  Once        Provider:  [facility]    Completed GARETT CALDERON              FOLLOW UP      Follow-up Information       Wang Marinelli MD Follow up on 5/16/2024.    Specialty: Family Medicine  Why: @ 8:00  [phone]  Contact information:  [address] [facility] [place] [address] [phone]               Karen Merrill NP Follow up in 1 week(s).    Specialty: Family Medicine  Contact information:  [facility] [place] [address] [phone]                                 DISCHARGE INSTRUCTIONS     Explained in detail to the patient about the discharge plan, medications, and follow-up visits. Pt understands and agrees  with the treatment plan.  Discharged Condition: stable  Diet as tolerated  Activities as tolerated  Discharge to: Home or Self Care    TIME SPENT ON DISCHARGE   35 minutes        Mustapha Olea MD  Internal Medicine  Department of [facility] Medicine  [facility] - 6th Floor Medical Telemetry      This document was created using electronic dictation services.  Please excuse any errors that may have been made.  Contact me if any questions regarding documentation to clarify verbiage.

## 2024-05-02 NOTE — ED NOTES
MD at bedside speaking with pt. While he does appear more alert pt will still fall asleep during assessment and rambles when asked what happened tonight

## 2024-05-02 NOTE — ED NOTES
Dr Atkinson at bedside for admit exam pt. States that he would like his father to be updated pt. Father Ernesto [phone]

## 2024-05-03 ENCOUNTER — PATIENT OUTREACH (OUTPATIENT)
Dept: ADMINISTRATIVE | Facility: CLINIC | Age: 39
End: 2024-05-03
Payer: COMMERCIAL

## 2024-05-03 NOTE — ED PROVIDER NOTES
Encounter Date: 5/2/2024    SCRIBE #1 NOTE: I, Tanggwendolyn Moore, am scribing for, and in the presence of,  Farhad Lentz MD. I have scribed the entire note.       History     Chief Complaint   Patient presents with    Withdrawal     Took Clonazepam at 10:00AM today prior to being discharged. Has been on Clonazepam for 7 years but decided to stop taking it after morning dose wants to make sure he is not having withdrawals. Currently asymptomatic.      38 year old male presents to the ED for medical advice. Pt states he has been on Clonazepam for 7 years. Pt states he has been tapering his dosage down for some time. Pt states he accidentally overdosed a few days ago and was admitted. Pt now states he threw away his Clonazepam and wants to stop taking it completely. Pt is experiencing no symptoms. Pt is asking if it is safe to stop taking the medication.     The history is provided by the patient. No  was used.     Review of patient's allergies indicates:  No Known Allergies  No past medical history on file.  No past surgical history on file.  No family history on file.     Review of Systems   Constitutional:  Negative for chills and fever.   HENT:  Negative for drooling and sore throat.    Eyes:  Negative for pain and redness.   Respiratory:  Negative for shortness of breath, wheezing and stridor.    Cardiovascular:  Negative for chest pain, palpitations and leg swelling.   Gastrointestinal:  Negative for abdominal pain, nausea and vomiting.   Genitourinary:  Negative for dysuria and hematuria.   Musculoskeletal:  Negative for back pain, neck pain and neck stiffness.   Skin:  Negative for rash and wound.   Neurological:  Negative for weakness and numbness.   Hematological:  Does not bruise/bleed easily.       Physical Exam     Initial Vitals [05/02/24 2024]   BP Pulse Resp Temp SpO2   129/81 107 20 98.6 °F (37 °C) 98 %      MAP       --         Physical Exam    Nursing note and vitals  reviewed.  Constitutional: He appears well-developed.   Eyes: EOM are normal. Pupils are equal, round, and reactive to light.   Cardiovascular:  Normal rate and regular rhythm.           No murmur heard.  Pulmonary/Chest: Breath sounds normal. No respiratory distress. He has no wheezes. He has no rales.   Abdominal: Abdomen is soft. He exhibits no distension. There is no abdominal tenderness.     Skin: Skin is warm. Capillary refill takes less than 2 seconds. No rash noted.         ED Course   Procedures  Labs Reviewed - No data to display       Imaging Results    None          Medications - No data to display  Medical Decision Making  Problems Addressed:  Medication refill: acute illness or injury    Risk  Prescription drug management.    Differential diagnosis (includes but is not limited to):   Medication noncompliance, lost medication, withdrawal    MDM Narrative  38-year-old male presents for evaluation for lost medication.  Patient states he is currently feeling well however he was flushed his Klonopin down the drain.  His father is concerned about possible withdrawal.  He has no concerning symptoms at this time.  He states he feels well at his baseline mental status.  Patient states he has good outpatient follow-up with Psychiatry.  I have stressed the importance of following up with a psychiatrist for further evaluation of his symptoms.  Medication refilled although will provide only a short course to bridge him to that appointment.    Dispo: Discharge    My independent radiology interpretation:   Point of care US (independently performed and interpreted):   Decision rules/clinical scoring:     Sepsis Perfusion Assessment:     Amount and/or Complexity of Data Reviewed  Independent historian:    Summary of history:   External data reviewed:   Summary of data reviewed:   Risk and benefits of testing: discussed         Discussion of management or test interpretation with external provider(s):    Summary of  discussion:     Risk  Shared decision making     Critical Care  none    Data Reviewed/Counseling: I have personally reviewed the patient's vital signs, nursing notes, and other relevant tests, information, and imaging. I had a detailed discussion regarding the historical points, exam findings, and any diagnostic results supporting the discharge diagnosis. I personally performed the history, PE, MDM and procedures as documented above and agree with the scribe's documentation.    Portions of this note were dictated using voice recognition software. Although it was reviewed for accuracy, some inherent voice recognition errors may have occurred and may be present in this document.          Scribe Attestation:   Scribe #1: I performed the above scribed service and the documentation accurately describes the services I performed. I attest to the accuracy of the note.    Attending Attestation:           Physician Attestation for Scribe:  Physician Attestation Statement for Scribe #1: I, Farhad Lentz MD, reviewed documentation, as scribed by Malorie Moore in my presence, and it is both accurate and complete.             ED Course as of 05/07/24 0906   Thu May 02, 2024   2220 Patient denies any symptoms currently.  Patient states he is ready for discharge home.  Patient states he will follow up with his PCP and mental health professional, he was a follow up appointment scheduled for Tuesday.  Strict return precautions discussed with the patient and his father, both have verbalized understanding.  Prescriptions provided. [MC]      ED Course User Index  [MC] Farhad Lentz MD                           Clinical Impression:  Final diagnoses:  [Z76.0] Medication refill (Primary)          ED Disposition Condition    Discharge           ED Prescriptions       Medication Sig Dispense Start Date End Date Auth. Provider    clonazePAM (KLONOPIN) 0.25 MG TbDL Take 1 tablet (0.25 mg total) by mouth 2 (two) times daily as  needed. 28 tablet 5/2/2024 5/16/2024 Farhad Lentz MD          Follow-up Information       Follow up With Specialties Details Why Contact Info    Wang Marinelli MD Family Medicine Schedule an appointment as soon as possible for a visit   [address] [place], [place] [address]uric[facility] [address]  [phone]      [facility] - Emergency Dept Emergency Medicine  If symptoms worsen [address] [place] [id number] [phone]             Farhad Lentz MD  05/07/24 0906

## 2024-05-03 NOTE — PROGRESS NOTES
C3 nurse spoke with Ernesto MALLORIE ennis for a TCC post hospital discharge follow up call. The patient has a scheduled [facility] appointment with Wang Marinelli MD Follow up on 5/16/2024 @8:00am

## 2024-05-03 NOTE — DISCHARGE INSTRUCTIONS
